# Patient Record
Sex: MALE | Race: WHITE | Employment: OTHER | URBAN - METROPOLITAN AREA
[De-identification: names, ages, dates, MRNs, and addresses within clinical notes are randomized per-mention and may not be internally consistent; named-entity substitution may affect disease eponyms.]

---

## 2018-07-08 ENCOUNTER — APPOINTMENT (OUTPATIENT)
Dept: GENERAL RADIOLOGY | Age: 63
End: 2018-07-08
Attending: EMERGENCY MEDICINE
Payer: COMMERCIAL

## 2018-07-08 ENCOUNTER — HOSPITAL ENCOUNTER (OUTPATIENT)
Age: 63
Discharge: HOME | End: 2018-07-08
Attending: EMERGENCY MEDICINE
Payer: COMMERCIAL

## 2018-07-08 VITALS
HEIGHT: 66 IN | OXYGEN SATURATION: 99 % | HEART RATE: 69 BPM | BODY MASS INDEX: 20.89 KG/M2 | WEIGHT: 130 LBS | SYSTOLIC BLOOD PRESSURE: 156 MMHG | TEMPERATURE: 98 F | DIASTOLIC BLOOD PRESSURE: 79 MMHG | RESPIRATION RATE: 16 BRPM

## 2018-07-08 DIAGNOSIS — S22.41XA CLOSED FRACTURE OF MULTIPLE RIBS OF RIGHT SIDE, INITIAL ENCOUNTER: Primary | ICD-10-CM

## 2018-07-08 PROCEDURE — 99203 OFFICE O/P NEW LOW 30 MIN: CPT

## 2018-07-08 PROCEDURE — 71101 X-RAY EXAM UNILAT RIBS/CHEST: CPT | Performed by: EMERGENCY MEDICINE

## 2018-07-08 NOTE — ED PROVIDER NOTES
Patient Seen in: [facility] In [address] [place] [address]    History   Patient presents with:  Fall (musculoskeletal, neurologic)    Stated Complaint: rib pain    HPI    The patient is a 63-year-old active male who fell onto appear while getting off sounds, nontender nondistended    ED Course   Labs Reviewed - No data to display    ED Course as of Jul 09 1703  ------------------------------------------------------------      MDM   Patient has fractures of R  ribs 7,8,9, and 10 with one fracture in eac

## 2023-09-08 ENCOUNTER — ORDER TRANSCRIPTION (OUTPATIENT)
Dept: ADMINISTRATIVE | Facility: HOSPITAL | Age: 68
End: 2023-09-08

## 2023-09-08 DIAGNOSIS — Z13.9 SCREENING FOR CONDITION: Primary | ICD-10-CM

## 2023-09-11 ENCOUNTER — HOSPITAL ENCOUNTER (OUTPATIENT)
Dept: ULTRASOUND IMAGING | Facility: HOSPITAL | Age: 68
Discharge: HOME | End: 2023-09-11
Attending: PHYSICIAN ASSISTANT

## 2023-09-11 DIAGNOSIS — Z13.9 SCREENING FOR CONDITION: ICD-10-CM

## 2023-10-02 ENCOUNTER — OFFICE VISIT (OUTPATIENT)
Dept: DERMATOLOGY CLINIC | Facility: CLINIC | Age: 68
End: 2023-10-02

## 2023-10-02 DIAGNOSIS — C44.612 BASAL CELL CARCINOMA (BCC) OF RIGHT UPPER ARM: ICD-10-CM

## 2023-10-02 DIAGNOSIS — D23.9 BENIGN NEOPLASM OF SKIN, UNSPECIFIED LOCATION: ICD-10-CM

## 2023-10-02 DIAGNOSIS — D48.5 NEOPLASM OF UNCERTAIN BEHAVIOR OF SKIN: Primary | ICD-10-CM

## 2023-10-02 DIAGNOSIS — D22.9 MULTIPLE NEVI: ICD-10-CM

## 2023-10-02 DIAGNOSIS — C44.612 BASAL CELL CARCINOMA (BCC) OF SKIN OF RIGHT WRIST: ICD-10-CM

## 2023-10-02 DIAGNOSIS — L82.1 SK (SEBORRHEIC KERATOSIS): ICD-10-CM

## 2023-10-02 DIAGNOSIS — L81.4 LENTIGO: ICD-10-CM

## 2023-10-02 PROCEDURE — 99203 OFFICE O/P NEW LOW 30 MIN: CPT | Performed by: DERMATOLOGY

## 2023-10-02 PROCEDURE — 1160F RVW MEDS BY RX/DR IN RCRD: CPT | Performed by: DERMATOLOGY

## 2023-10-02 PROCEDURE — 17262 DSTRJ MAL LES T/A/L 1.1-2.0: CPT | Performed by: DERMATOLOGY

## 2023-10-02 PROCEDURE — 88305 TISSUE EXAM BY PATHOLOGIST: CPT | Performed by: DERMATOLOGY

## 2023-10-02 PROCEDURE — 1159F MED LIST DOCD IN RCRD: CPT | Performed by: DERMATOLOGY

## 2023-10-05 NOTE — PROGRESS NOTES
The pathology report from last visit showed   A. Skin, right antecubital, shave biopsy:  -Nodular basal cell carcinoma, extends to deep tissue edge. B.  Skin, right dorsal wrist, shave biopsy:  -Nodular basal cell carcinoma with extensive squamatization, extends to peripheral and deep tissue edges. Both e&c'ed at [facility] . Re-check in spring when he is back from [place]  . Please log in test results. Kg lopez call patient and inform of results and recommendations.  (please add to history). Pt to  rtc   - or prn.

## 2023-10-15 NOTE — PROGRESS NOTES
Maxim Cuadra is a 79year old male. HPI:     CC:  Patient presents with:  Lesion: \"New Patient\" present for 2 spots of concern on his R forearm. The lesions are scabby and they won't heal. Notes, there is some bleeding at times. Denies personal Hx of skin cancer. Has family Hx of melanoma(Mother). Allergies:  Patient has no known allergies. HISTORY:    Past Medical History:   Diagnosis Date    Nodular basal cell carcinoma (BCC) 10/2023    Right antecubital    Nodular basal cell carcinoma (BCC) 10/2023    Right dorsal wrist      History reviewed. No pertinent surgical history. History reviewed. No pertinent family history. Social History     Socioeconomic History    Marital status:    Tobacco Use    Smoking status: Former    Smokeless tobacco: Never   Other Topics Concern    History of tanning Yes    Reaction to local anesthetic No    Pt has a pacemaker No    Pt has a defibrillator No        No current outpatient medications on file. Allergies:   No Known Allergies    Past Medical History:   Diagnosis Date    Nodular basal cell carcinoma (BCC) 10/2023    Right antecubital    Nodular basal cell carcinoma (BCC) 10/2023    Right dorsal wrist     History reviewed. No pertinent surgical history.   Social History    Socioeconomic History      Marital status:       Spouse name: Not on file      Number of children: Not on file      Years of education: Not on file      Highest education level: Not on file    Occupational History      Not on file    Tobacco Use      Smoking status: Former      Smokeless tobacco: Never    Substance and Sexual Activity      Alcohol use: Not on file      Drug use: Not on file      Sexual activity: Not on file    Other Topics      Concerns:        Grew up on a farm: Not Asked        History of tanning: Yes        Outdoor occupation: Not Asked        Reaction to local anesthetic: No        Pt has a pacemaker: No        Pt has a defibrillator: No    Social History Narrative      Not on file    Social Determinants of Health  Financial Resource Strain: Not on file  Food Insecurity: Not on file  Transportation Needs: Not on file  Physical Activity: Not on file  Stress: Not on file  Social Connections: Not on file  Housing Stability: Not on file  History reviewed. No pertinent family history. There were no vitals filed for this visit. HPI:  Patient presents with:  Lesion: \"New Patient\" present for 2 spots of concern on his R forearm. The lesions are scabby and they won't heal. Notes, there is some bleeding at times. Denies personal Hx of skin cancer. Has family Hx of melanoma(Mother). Patient presents with concerns above. Patient has been in their usual state of health. Past notes/ records and appropriate/relevant lab results including pathology and past body maps reviewed. Including outside notes/ PCP notes as appropriate. Updated and new information noted in current visit. ROS:  Denies other relevant systemic complaints. History, medications, allergies reviewed as noted. Physical Examination:     Well-developed well-nourished patient alert oriented in no acute distress. Exam performed, including scalp, head, neck, face,nails, hair, external eyes, including conjunctival mucosa, eyelids, lips external ears , arms, digits,palms. Multiple light to medium brown, well marginated, uniformly pigmented, macules and papules 6 mm and less scattered on exam. pigmented lesions examined with dermoscopy benign-appearing patterns. Waxy tannish keratotic papules scattered, cherry-red vascular papules scattered. See map today's date for lesions noted . See assessment and plan below for specific lesions. Otherwise remarkable for lesions as noted on map.     See A/P  below for additional information:    Assessment / plan:    Orders Placed This Encounter      Pathology Tissue P      Meds & Refills for this Visit:  Requested Prescriptions No prescriptions requested or ordered in this encounter         Neoplasm of uncertain behavior of skin  (primary encounter diagnosis)  Sk (seborrheic keratosis)  Lentigo  Multiple nevi  Benign neoplasm of skin, unspecified location  Basal cell carcinoma (bcc) of right upper arm  Basal cell carcinoma (bcc) of skin of right wrist    pt with crusted non healing lesions, histoyr of excessive sun Spec 1 Description >>>>>: right antecubital Spec 1 Comment: r/o BCC pearly plaque 1.6cm -base e&c'ed Spec 2 Description >>>>>: right dorsal wrist Spec 2 Comment: 1.8cm erthematous keratotic crusted plaque r/o SCC base e&c'ed Primary Care Physician: PCP, NONE New collection   Basal cell carcinoma. Electrodesiccation and curettage performed. See operative report, consent. Cancers nature discussed. Possibility of recurrence reviewed. Possibility of scarring reviewed. Any postoperative problems including bleeding, signs symptoms of infection reviewed. Postoperative information sheet given. x2 lesions    Extensive lentigines, diffuse actinic damage. Red scaly crusted lesion at  left scapula  Observe-trial of cryo, biopsy if not resolved at next office visit. 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    Extensive lentigines,  No other suspicious lesions presently  Benign keratoses on the upper back reassurance  Waxy tan keratotic papules lesions in areas of concern as noted reassurance given. Benign nature discussed. Possibility of cryo, alphahydroxy acids over-the-counter retinol's discussed. Please refer to map for specific lesions. See additional diagnoses. Pros cons of various therapies, risks benefits discussed. Pathophysiology discussed with patient. Therapeutic options reviewed. See  Medications in grid. Instructions reviewed at length. Benign nevi, seborrheic  keratoses, cherry angiomas:  Reassurance regarding other benign skin lesions. Signs and symptoms of skin cancer, ABCDE's of melanoma discussed with patient. Sunscreen use, sun protection, self exams reviewed. Followup as noted RTC ---routine checkup    6 mos -one year or p.r.n. Encounter Times   Including precharting, reviewing chart, prior notes obtaining history: 10 minutes, medical exam :10 minutes, notes on body map, plan, counseling 10minutes My total time spent caring for the patient on the day of the encounter: 30 minutes     The patient indicates understanding of these issues and agrees to the plan. The patient is asked to return as noted in follow-up/ above. This note was generated using Dragon voice recognition software. Please contact me regarding any confusion resulting from errors in recognition. .  Note to patient and family: The [facility] makes medical notes like these available to patients. However, be advised this is a medical document. It is intended as [id number] communication and monitoring of a patient's care needs. It is written in medical language and may contain abbreviations or verbiage that are unfamiliar. It may appear blunt or direct. Medical documents are intended to carry relevant information, facts as evident and the clinical opinion of the practitioner.

## 2023-10-15 NOTE — PROGRESS NOTES
Operative Report         Electrodesiccation and Curettage  Clinical diagnosis:  Size of lesion:  Location:pt with crusted non healing lesions, histoyr of excessive sun Spec 1 Description >>>>>: right antecubital Spec 1 Comment: r/o BCC pearly plaque 1.6cm -base e&c'ed Spec 2 Description >>>>>: right dorsal wrist Spec 2 Comment: 1.8cm erthematous keratotic crusted plaque r/o SCC base e&c'ed Primary Care Physician: PCP, NONE New collection     Procedure: With patient in appropriate position the skin of the above was scrubbed with alcohol. Anesthesia was obtained by injecting 1% Xylocaine with epinephrine 3.0 cc. Sharp dissection of mass initially achieved with curette. Hemostasis and further destruction of marginal lesion provided by electrodesiccation. This process was repeated 3 times. The biopsy site was dressed with Polysporin and Band-Aid. Estimated blood loss less than 2 cc. The patient tolerated procedure well. Complications: None    Written instructions given and reviewed by nursing staff. Patient indicates understanding.     See pathology report if appropriate

## 2024-08-19 ENCOUNTER — OFFICE VISIT (OUTPATIENT)
Dept: DERMATOLOGY CLINIC | Facility: CLINIC | Age: 69
End: 2024-08-19
Payer: COMMERCIAL

## 2024-08-19 DIAGNOSIS — Z08 ENCOUNTER FOR FOLLOW-UP SURVEILLANCE OF SKIN CANCER: ICD-10-CM

## 2024-08-19 DIAGNOSIS — D23.9 BENIGN NEOPLASM OF SKIN, UNSPECIFIED LOCATION: ICD-10-CM

## 2024-08-19 DIAGNOSIS — D48.5 NEOPLASM OF UNCERTAIN BEHAVIOR OF SKIN: Primary | ICD-10-CM

## 2024-08-19 DIAGNOSIS — L81.4 LENTIGO: ICD-10-CM

## 2024-08-19 DIAGNOSIS — Z85.828 ENCOUNTER FOR FOLLOW-UP SURVEILLANCE OF SKIN CANCER: ICD-10-CM

## 2024-08-19 DIAGNOSIS — C44.519 BCC (BASAL CELL CARCINOMA), BACK: ICD-10-CM

## 2024-08-19 DIAGNOSIS — D22.9 MULTIPLE NEVI: ICD-10-CM

## 2024-08-19 DIAGNOSIS — L82.1 SK (SEBORRHEIC KERATOSIS): ICD-10-CM

## 2024-08-19 PROCEDURE — 17263 DSTRJ MAL LES T/A/L 2.1-3.0: CPT | Performed by: DERMATOLOGY

## 2024-08-19 PROCEDURE — 1160F RVW MEDS BY RX/DR IN RCRD: CPT | Performed by: DERMATOLOGY

## 2024-08-19 PROCEDURE — 1126F AMNT PAIN NOTED NONE PRSNT: CPT | Performed by: DERMATOLOGY

## 2024-08-19 PROCEDURE — 1159F MED LIST DOCD IN RCRD: CPT | Performed by: DERMATOLOGY

## 2024-08-19 PROCEDURE — 99213 OFFICE O/P EST LOW 20 MIN: CPT | Performed by: DERMATOLOGY

## 2024-08-19 PROCEDURE — 88305 TISSUE EXAM BY PATHOLOGIST: CPT | Performed by: DERMATOLOGY

## 2024-08-23 NOTE — PROGRESS NOTES
Logged in path book and pmh.  Pt informed of pathology results and follow up recommendations and verbalized understanding.

## 2024-08-23 NOTE — PROGRESS NOTES
The pathology report from last visit showed   left mid back, shave biopsy:  -Nodular basal cell carcinoma  Lesion treated with ENC at last office visit no further surgery needed  Recommend recheck in 3 to 4 months  Please log in test results.  Please call patient and inform of results and recommendations.  (please add to history).  Pt to  rtc 3 to 4 months or prn.

## 2024-08-28 ENCOUNTER — NURSE ONLY (OUTPATIENT)
Facility: CLINIC | Age: 69
End: 2024-08-28

## 2024-09-01 NOTE — PROGRESS NOTES
Operative Report         Electrodesiccation and Curettage  Clinical diagnosis:  Size of lesion:  Location: Patient with bleeding crusted plaque 2.3cm left mid back r/o BCC -base treated with E&C     Procedure:    With patient in appropriate position the skin of the above was scrubbed with alcohol.  Anesthesia was obtained by injecting 1% Xylocaine with epinephrine 3.0 cc.  Sharp dissection of mass initially achieved with curette.  Hemostasis and further destruction of marginal lesion provided by electrodesiccation.  This process was repeated 3 times.  The biopsy site was dressed with Polysporin and Band-Aid.  Estimated blood loss less than 2 cc.    The patient tolerated procedure well.    Complications: None    Written instructions given and reviewed by nursing staff.  Patient indicates understanding.    See pathology report if appropriate

## 2024-09-01 NOTE — PROGRESS NOTES
Chet Ryan is a 68 year old male.  HPI:     CC:    Chief Complaint   Patient presents with    Full Skin Exam     LOV 10/23. Pt present for full body exam. Pt denies any personal hx of skin CA, Mother has hx of Melanoma. Pt present with a spot of concern on his back, spot was noticed by daughter and was told to get it looked at x 5 months.           Allergies:  Patient has no known allergies.    HISTORY:    Past Medical History:    BCC (basal cell carcinoma)    Left mid back    Nodular basal cell carcinoma (BCC)    Right antecubital    Nodular basal cell carcinoma (BCC)    Right dorsal wrist      History reviewed. No pertinent surgical history.   History reviewed. No pertinent family history.   Social History     Socioeconomic History    Marital status:    Tobacco Use    Smoking status: Former    Smokeless tobacco: Never   Other Topics Concern    History of tanning Yes    Reaction to local anesthetic No    Pt has a pacemaker No    Pt has a defibrillator No        No current outpatient medications on file.     Allergies:   No Known Allergies    Past Medical History:    BCC (basal cell carcinoma)    Left mid back    Nodular basal cell carcinoma (BCC)    Right antecubital    Nodular basal cell carcinoma (BCC)    Right dorsal wrist     History reviewed. No pertinent surgical history.  Social History     Socioeconomic History    Marital status:      Spouse name: Not on file    Number of children: Not on file    Years of education: Not on file    Highest education level: Not on file   Occupational History    Not on file   Tobacco Use    Smoking status: Former    Smokeless tobacco: Never   Substance and Sexual Activity    Alcohol use: Not on file    Drug use: Not on file    Sexual activity: Not on file   Other Topics Concern    Grew up on a farm Not Asked    History of tanning Yes    Outdoor occupation Not Asked    Reaction to local anesthetic No    Pt has a pacemaker No    Pt has a defibrillator No    Social History Narrative    Not on file     Social Determinants of Health     Financial Resource Strain: Not on file   Food Insecurity: Not on file   Transportation Needs: Not on file   Physical Activity: Not on file   Stress: Not on file   Social Connections: Not on file   Housing Stability: Not on file     History reviewed. No pertinent family history.    There were no vitals filed for this visit.    HPI:  Chief Complaint   Patient presents with    Full Skin Exam     LOV 10/23. Pt present for full body exam. Pt denies any personal hx of skin CA, Mother has hx of Melanoma. Pt present with a spot of concern on his back, spot was noticed by daughter and was told to get it looked at x 5 months.             Patient presents with concerns above.    Patient has been in their usual state of health.     Past notes/ records and appropriate/relevant lab results including pathology and past body maps reviewed. Including outside notes/ PCP notes as appropriate. Updated and new information noted in current visit.     ROS:  Denies other relevant systemic complaints.      History, medications, allergies reviewed as noted.       Physical Examination:     Well-developed well-nourished patient alert oriented in no acute distress.  Exam performed, including scalp, head, neck, face,nails, hair, external eyes, including conjunctival mucosa, eyelids, lips external ears , arms, digits,palms.     Multiple light to medium brown, well marginated, uniformly pigmented, macules and papules 6 mm and less scattered on exam. pigmented lesions examined with dermoscopy benign-appearing patterns.     Waxy tannish keratotic papules scattered, cherry-red vascular papules scattered.    See map today's date for lesions noted .  See assessment and plan below for specific lesions.    Otherwise remarkable for lesions as noted on map.    See A/P  below for additional information:    Assessment / plan:    Orders Placed This Encounter   Procedures     Specimen to Pathology, Tissue [[facility] Pt to [facility] lab]       Meds & Refills for this Visit:  Requested Prescriptions      No prescriptions requested or ordered in this encounter         Encounter Diagnoses   Name Primary?    Neoplasm of uncertain behavior of skin Yes    Multiple nevi     Lentigo     SK (seborrheic keratosis)     Benign neoplasm of skin, unspecified location     Encounter for follow-up surveillance of skin cancer     BCC (basal cell carcinoma), back [C44.519]           Patient with bleeding crusted plaque 2.3cm left mid back r/o BCC -base treated with E&C new primary lesion sent for path  Basal   cell carcinoma.  Electrodesiccation and curettage performed.  See operative report, consent.  Cancers nature discussed.  Possibility of recurrence reviewed.  Possibility of scarring reviewed.  Any postoperative problems including bleeding, signs symptoms of infection reviewed.  Postoperative information sheet given.      Right antecubital fossa, BCC right dorsal wrist post ENC 10/23    Extensive lentigines, diffuse actinic damage multiple SKs    Lots of sun outdoors in [place].  Outdoors retail rides motorcycle.  Lots of sun in the past.    Extensive lentigines,  No other suspicious lesions presently  Benign keratoses on the upper back reassurance  Waxy tan keratotic papules lesions in areas of concern as noted reassurance given.  Benign nature discussed.  Possibility of cryo, alphahydroxy acids over-the-counter retinol's discussed.    Please refer to map for specific lesions.  See additional diagnoses.  Pros cons of various therapies, risks benefits discussed.Pathophysiology discussed with patient.  Therapeutic options reviewed.  See  Medications in grid.  Instructions reviewed at length.    Benign nevi, seborrheic  keratoses, cherry angiomas:  Reassurance regarding other benign skin lesions.Signs and symptoms of skin cancer, ABCDE's of melanoma discussed with patient. Sunscreen use, sun protection, self  exams reviewed.  Followup as noted RTC ---routine checkup    6 mos -one year or p.r.n.    Encounter Times   Including precharting, reviewing chart, prior notes obtaining history: 10 minutes, medical exam :10 minutes, notes on body map, plan, counseling 10minutes My total time spent caring for the patient on the day of the encounter: 30 minutes     The patient indicates understanding of these issues and agrees to the plan.  The patient is asked to return as noted in follow-up/ above.    This note was generated using Dragon voice recognition software.  Please contact me regarding any confusion resulting from errors in recognition..  Note to patient and family: The 21[employer] Century [employer] Act makes medical notes like these available to patients. However, be advised this is a medical document. It is intended as rlno-or-swvl communication and monitoring of a patient's care needs. It is written in medical language and may contain abbreviations or verbiage that are unfamiliar. It may appear blunt or direct. Medical documents are intended to carry relevant information, facts as evident and the clinical opinion of the practitioner.

## 2024-09-04 ENCOUNTER — LAB ENCOUNTER (OUTPATIENT)
Dept: LAB | Facility: HOSPITAL | Age: 69
End: 2024-09-04
Payer: MEDICARE

## 2024-09-04 ENCOUNTER — OFFICE VISIT (OUTPATIENT)
Facility: CLINIC | Age: 69
End: 2024-09-04

## 2024-09-04 VITALS
HEIGHT: 65 IN | DIASTOLIC BLOOD PRESSURE: 76 MMHG | SYSTOLIC BLOOD PRESSURE: 160 MMHG | BODY MASS INDEX: 21.96 KG/M2 | WEIGHT: 131.81 LBS | HEART RATE: 79 BPM

## 2024-09-04 DIAGNOSIS — F10.10 ALCOHOL ABUSE, DAILY USE: ICD-10-CM

## 2024-09-04 DIAGNOSIS — Z12.11 COLON CANCER SCREENING: Primary | ICD-10-CM

## 2024-09-04 LAB
ALBUMIN SERPL-MCNC: 4.7 G/DL (ref 3.2–4.8)
ALP LIVER SERPL-CCNC: 57 U/L
ALT SERPL-CCNC: 18 U/L
AST SERPL-CCNC: 32 U/L (ref ?–34)
BILIRUB DIRECT SERPL-MCNC: 0.3 MG/DL (ref ?–0.3)
BILIRUB SERPL-MCNC: 0.7 MG/DL (ref 0.2–1.1)
PROT SERPL-MCNC: 7.9 G/DL (ref 5.7–8.2)

## 2024-09-04 PROCEDURE — 3077F SYST BP >= 140 MM HG: CPT

## 2024-09-04 PROCEDURE — 3008F BODY MASS INDEX DOCD: CPT

## 2024-09-04 PROCEDURE — 99204 OFFICE O/P NEW MOD 45 MIN: CPT

## 2024-09-04 PROCEDURE — 36415 COLL VENOUS BLD VENIPUNCTURE: CPT

## 2024-09-04 PROCEDURE — 3078F DIAST BP <80 MM HG: CPT

## 2024-09-04 PROCEDURE — 1160F RVW MEDS BY RX/DR IN RCRD: CPT

## 2024-09-04 PROCEDURE — 1126F AMNT PAIN NOTED NONE PRSNT: CPT

## 2024-09-04 PROCEDURE — 80076 HEPATIC FUNCTION PANEL: CPT

## 2024-09-04 PROCEDURE — 1159F MED LIST DOCD IN RCRD: CPT

## 2024-09-04 NOTE — H&P
[facility] - Gastroenterology                                                                                                  Clinic History and Physical     Chief Complaint   Patient presents with    Colonoscopy Screening       Requesting physician or provider: None Pcp    HPI:   Chet Ryan is a 68 year old year-old male with active diagnoses including none. Prior medical/surgical hx in note table. The patient presents for colon cancer screening evaluation.    #daily alcohol use  -daily alcohol use for 15 years, about 6-7 beers daily, reports he drinks for social/recreational reasons. Denies prior self or family liver issues. Unsure if PCP at [facility] checked liver function labs.     Patient is here today as a referral from his PCP for evaluation prior to undergoing colonoscopy for CRC screening. Patient denies any GI symptoms of nausea, vomiting, heartburn, dyspepsia, dysphagia, hematemesis, abdominal pain, change in bowel habits, constipation, diarrhea, hematochezia, or melena.  Additionally there is no unintentional weight loss.    Pt is due for CRC screening. We discussed the available screening options for CRC such as FIT and cologuard. They are electing to pursue colonoscopy at this time. Has done FIT testing in the past, including earlier this yeat and always had negative results.    Last colonoscopy: none   Last EGD:  none     NSAIDS:  none   Tobacco: none   Alcohol: 6-7 beers daily   Marijuana: none   Illicit drugs: none     FH GI malignancy: none     No history of adverse reaction to sedation  No RAYNE  No anticoagulants/antiplatelet  No pacemaker/defibrillator  No pain medications and/or sleep aides    Wt Readings from Last 6 Encounters:   09/04/24 131 lb 12.8 oz (59.8 kg)   07/08/18 130 lb (59 kg)          History, Medications, Allergies, ROS:      Past Medical History:    BCC (basal cell carcinoma)    Left mid back    Nodular basal cell carcinoma (BCC)    Right antecubital    Nodular basal  cell carcinoma (BCC)    Right dorsal wrist      Past Surgical History:   Procedure Laterality Date    Appendectomy      age 7      Family Hx: History reviewed. No pertinent family history.   Social History:   Social History     Socioeconomic History    Marital status:    Tobacco Use    Smoking status: Former     Current packs/day: 0.00     Types: Cigarettes     Quit date: 1980     Years since quittin.9    Smokeless tobacco: Never   Substance and Sexual Activity    Alcohol use: Yes     Alcohol/week: 60.0 standard drinks of alcohol     Types: 60 Cans of beer per week     Comment: DAILY 6-7    Drug use: Not Currently   Other Topics Concern    History of tanning Yes    Reaction to local anesthetic No    Pt has a pacemaker No    Pt has a defibrillator No        Medications (Active prior to today's visit):  No current outpatient medications on file.       Allergies:  No Known Allergies    ROS:   CONSTITUTIONAL: negative for fevers, chills, sweats  EYES Negative for scleral icterus or redness, and diplopia  HEENT: Negative for hoarseness  RESPIRATORY: Negative for cough and severe shortness of breath  CARDIOVASCULAR: Negative for crushing sub-sternal chest pain  GASTROINTESTINAL: See HPI  GENITOURINARY: Negative for dysuria  MUSCULOSKELETAL: Negative for arthralgias and myalgias  SKIN: Negative for jaundice, rash or pruritus  NEUROLOGICAL: Negative for dizziness and headaches  BEHAVIOR/PSYCH: Negative for psychotic behavior      PHYSICAL EXAM:   Blood pressure 160/76, pulse 79, height 5' 5\" (1.651 m), weight 131 lb 12.8 oz (59.8 kg).    GEN: Alert, no acute distress, well-nourished   HEENT: anicteric sclera, neck supple, trachea midline, MMM, no palpable or tender neck or supraclavicular lymph nodes  CV: RRR, the extremities are warm and well perfused   LUNGS: No increased work of breathing, CTAB  ABDOMEN: Soft, symmetrical, non-tender without distention or guarding. No scars or lesions. Aorta is without  bruit or visible pulsation. Umbilicus is midline without herniation. Normoactive bowel sounds are present, No masses, hepatomegaly or splenomegaly noted.  MSK: No erythema, no warmth, no swelling of joints  SKIN: No jaundice, no erythema, no rashes, no lesions  HEMATOLOGIC: No bleeding, no bruising  NEURO: Alert and interactive, SAMANIEGO  PSYCH: appropriate mood & affect    Labs/Imaging:     Patient's labs and imaging were reviewed and discussed with patient today.    .  ASSESSMENT/PLAN:   Chet Ryan is a 68 year old year-old male with active diagnoses including none. Prior medical/surgical hx in note table. The patient presents for colon cancer screening evaluation.    #daily alcohol use  -daily alcohol use for 15 years, about 6-7 beers daily, reports he drinks for social/recreational reasons. Denies prior self or family liver issues. Unsure if PCP at [facility] checked liver function labs.   -LFTs & US elastography ordered    #colorectal cancer screening: patient is considered average risk for colon cancer (No immediate family hx of colon cancer) and it is appropriate to proceed with screening colonoscopy. Patient is currently asymptomatic and denies diarrhea, hematochezia, thin-stools or weight loss. We discussed risks/benefits/alternatives to procedure, including stool testing, they want to proceed with colonoscopy.  -no prior colonoscopy but does report regular FIT tests through PCP and/or insurance and always had negative test, including one earlier this year  -pt lives in [place] most of the year. He leaves again end of this month and will not return until June 2025. I told him to call clinic in January to have the colonoscopy booked for summer 2025. Return precautions given for any new GI symptoms.     Recommend:  -decrease alcohol use     -call US liver elastography #[phone]    -go to lab for liver function test    -call in January 2025 for us to book the colonoscopy for June 2025    -Schedule  colonoscopy with General Pool Endoscopist  Diagnosis: CRC screen  Sedation: MAC or IV  Prep: split dose golytely    -Anti-platelets and anti-coagulants: N/A    -Diabetes meds: N/A     Endoscopy risk/benefit discussion: I have thoroughly discussed the risks, benefits, and alternatives of endoscopic evaluation with the patient, who demonstrated understanding. This includes the potential risks of bleeding, infection, pain, anesthesia complications, and perforation, which may result in prolonged hospitalization or surgical intervention. All of the patient’s questions were addressed to their satisfaction. The patient has chosen to proceed with the endoscopic procedure, including any necessary interventions such as polypectomy, biopsy, control of bleeding.      Orders This Visit:  Orders Placed This Encounter   Procedures    Hepatic Function Panel (7)       Meds This Visit:  Requested Prescriptions      No prescriptions requested or ordered in this encounter       Imaging & Referrals:  US LIVER WITH ELASTOGRAPHY(CPT=76705,57726)       LAUREN Nunez    [facility]  9/4/2024      The dictation was partially prepared using Dragon Medical voice recognition software. As a result, errors may occur. When identified, these errors have been corrected. While every attempt is made to correct errors during dictation, discrepancies may still exist.

## 2024-09-04 NOTE — PATIENT INSTRUCTIONS
-decrease alcohol use     -call US liver elastography #[phone]    -go to lab for liver function test    -call in January 2025 for us to book the colonoscopy for June 2025    1. Schedule colonoscopy with General Pool Endoscopist  Diagnosis: CRC screen  Sedation: MAC or IV  Prep: split dose golytely    2.  bowel prep from pharmacy   You can pick the bowel prep up now and store in a cool, dry place in your home until your scheduled bowel prep start date.    3. Continue all medications as normal for your procedure. DO NOT TAKE: Any form of alcohol, recreational drugs and any forms of erectile dysfunction medications 24 hours prior to procedure.    4. Read all bowel prep instructions carefully. Bowel prep instructions can also be found online at:  [email or web address].org/giprep     5. AVOID seeds, nuts, popcorn, raw fruits and vegetables for 5 days before procedure    6. If you start any NEW medication after your visit today, please notify us. Certain medications (like iron or weight loss medications) will need to be held before the procedure, or the procedure cannot be performed safely.

## 2024-09-15 ENCOUNTER — HOSPITAL ENCOUNTER (OUTPATIENT)
Age: 69
Discharge: HOME | End: 2024-09-15
Payer: MEDICARE

## 2024-09-15 VITALS
HEART RATE: 100 BPM | TEMPERATURE: 98 F | DIASTOLIC BLOOD PRESSURE: 78 MMHG | SYSTOLIC BLOOD PRESSURE: 158 MMHG | RESPIRATION RATE: 16 BRPM | OXYGEN SATURATION: 100 %
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
## 2024-09-15 DIAGNOSIS — L08.9 INFECTED DOG BITE: Primary | ICD-10-CM

## 2024-09-15 DIAGNOSIS — I89.1 LYMPHANGITIS: ICD-10-CM

## 2024-09-15 NOTE — ED PROVIDER NOTES
Patient Seen in: [facility]      History     Chief Complaint   Patient presents with    Bite     Stated Complaint: Dog Bite  Subjective:   HPI    This is a 68-year-old with a dog bite to the webbing in between the second and third digit of the left hand.  Night by his daughters dog.  Daughter's dog is up-to-date with immunizations.  Patient denies any numbness or tingling.  Woke up today and it was erythematous which prompted the visit.  No fever or chills.  No nausea.  States he otherwise feels well.  Normal flexion extension of all digits not up-to-date with tetanus    Objective:   Past Medical History:    BCC (basal cell carcinoma)    Left mid back    Nodular basal cell carcinoma (BCC)    Right antecubital    Nodular basal cell carcinoma (BCC)    Right dorsal wrist            Past Surgical History:   Procedure Laterality Date    Appendectomy      age 7              Social History     Socioeconomic History    Marital status:    Tobacco Use    Smoking status: Former     Current packs/day: 0.00     Types: Cigarettes     Quit date: 1980     Years since quittin.0    Smokeless tobacco: Never   Substance and Sexual Activity    Alcohol use: Yes     Alcohol/week: 60.0 standard drinks of alcohol     Types: 60 Cans of beer per week     Comment: DAILY 6-7    Drug use: Not Currently   Other Topics Concern    History of tanning Yes    Reaction to local anesthetic No    Pt has a pacemaker No    Pt has a defibrillator No            Review of Systems   All other systems reviewed and are negative.      Positive for stated complaint: Bite    Other systems are as noted in HPI.  Constitutional and vital signs reviewed.      All other systems reviewed and negative except as noted above.    Physical Exam     ED Triage Vitals [09/15/24 1348]   /78   Pulse 111   Resp 16   Temp 98.1 °F (36.7 °C)   Temp src Temporal   SpO2 100 %   O2 Device None (Room air)     Current:/78   Pulse 100   Temp 98.1  °F (36.7 °C) (Temporal)   Resp 16   SpO2 100%     Physical Exam  Vitals and nursing note reviewed.   Constitutional:       General: He is awake. He is not in acute distress.     Appearance: Normal appearance. He is not ill-appearing, toxic-appearing or diaphoretic.   HENT:      Head: Normocephalic and atraumatic.      Right Ear: Tympanic membrane, ear canal and external ear normal.      Left Ear: Tympanic membrane, ear canal and external ear normal.      Nose: Nose normal.      Mouth/Throat:      Mouth: Mucous membranes are moist.      Pharynx: Oropharynx is clear. Uvula midline.   Eyes:      General: Lids are normal.      Extraocular Movements: Extraocular movements intact.      Conjunctiva/sclera: Conjunctivae normal.      Pupils: Pupils are equal, round, and reactive to light.   Cardiovascular:      Rate and Rhythm: Normal rate and regular rhythm.      Pulses: Normal pulses.      Heart sounds: Normal heart sounds.   Pulmonary:      Effort: Pulmonary effort is normal.      Breath sounds: Normal breath sounds and air entry. No stridor, decreased air movement or transmitted upper airway sounds.   Skin:     General: Skin is warm and dry.      Capillary Refill: Capillary refill takes less than 2 seconds.   Neurological:      General: No focal deficit present.      Mental Status: He is alert and oriented to person, place, and time.   Psychiatric:         Mood and Affect: Mood normal.         Behavior: Behavior normal. Behavior is cooperative.         Thought Content: Thought content normal.         Judgment: Judgment normal.               Normal flexion and extension of the digits.  Cap refill less than 3 seconds.  Neuro vastly intact  ED Course   The wound was irrigated with copious amounts of saline, Neosporin and dressing applied.  No results found.  Labs Reviewed - No data to display    MDM     Medical Decision Making  Differential diagnoses reflecting the complexity of care include but are not limited to  lymphangitis, tenosynovitis, dog bite, infected dog bite.    Comorbidities that add complexity to management include: N/A  History obtained by an independent source was from: N/A  Discussions of management was done with: Dr. Russell  My independent interpretations of studies include: N/A  Shared decision making was done by: Dr. Russell, patient and myself  Patient is well appearing, non-toxic and in no acute distress.  Vital signs are stable.  Patient otherwise feels well, he is afebrile.  I did discuss with him is imperative he starts his antibiotics today.  Any fever, chills, increase in redness or streaking he had immediately needs to be seen in the emergency department.  Tomorrow morning please call hand specialist that is provided you on your discharge paperwork.  Patient and daughter verbalized care and states understanding.  Extremity neurovascular intact at discharge.    Problems Addressed:  Infected dog bite: acute illness or injury  Lymphangitis: acute illness or injury    Amount and/or Complexity of Data Reviewed  Independent Historian: caregiver     Details: Daughter  ECG/medicine tests: ordered and independent interpretation performed. Decision-making details documented in ED Course.    Risk  OTC drugs.  Prescription drug management.        Disposition and Plan     Clinical Impression:  1. Infected dog bite    2. Lymphangitis         Disposition:  Discharge  9/15/2024  2:22 pm    Follow-up:  [facility]  [address] [facility] [address]  [phone]          Beni Chadwick MD  [address] [facility] [address]  [phone]                Medications Prescribed:  Discharge Medication List as of 9/15/2024  2:28 PM        START taking these medications    Details   amoxicillin clavulanate 875-125 MG Oral Tab Take 1 tablet by mouth 2 (two) times daily for 10 days., Normal, Disp-20 tablet, R-0                Note to patient: The 21st Century cares act makes medical notes like these available to  patients in the interest of transparency.  However, be advised this medical document and is intended as peer to peer communication.  It is read the medical language and may contain abbreviations or verbiage that are unfamiliar.  It may appear blunt or direct.  Medical documents are intended to carry relevant information, fax is evident and the clinical opinion of the practitioner.    This note was prepared using Dragon Medical voice recognition dictation software.  As a result, errors may occur.  When identified, these errors have been corrected.  While every attempt is made to correct errors during dictation, discrepancies may still exist.    Maribel Hester, LAUREN  9/15/2024  2:06 PM

## 2024-09-15 NOTE — DISCHARGE INSTRUCTIONS
Please keep the area clean and dry.  Is imperative you start the antibiotics today and complete entire course of treatment.  I have put the hand specialist on your discharge paperwork that I would like you to call tomorrow to schedule a follow-up appointment.  Any increase in redness, streaking, fever, chills or any other concerns please immediately go to the emergency department.

## 2024-09-18 ENCOUNTER — HOSPITAL ENCOUNTER (OUTPATIENT)
Dept: ULTRASOUND IMAGING | Age: 69
Discharge: HOME | End: 2024-09-18
Payer: MEDICARE

## 2024-09-18 DIAGNOSIS — F10.10 ALCOHOL ABUSE, DAILY USE: ICD-10-CM

## 2024-09-18 PROCEDURE — 76981 USE PARENCHYMA: CPT

## 2024-09-18 PROCEDURE — 76705 ECHO EXAM OF ABDOMEN: CPT

## 2024-09-19 ENCOUNTER — TELEPHONE (OUTPATIENT)
Facility: CLINIC | Age: 69
End: 2024-09-19

## 2024-09-19 DIAGNOSIS — K76.9 HEPATIC LESION: ICD-10-CM

## 2024-09-19 DIAGNOSIS — K74.00 LIVER FIBROSIS: Primary | ICD-10-CM

## 2024-09-19 NOTE — TELEPHONE ENCOUNTER
----- Message from Zaida Moore sent at 9/19/2024  3:27 PM CDT -----  F1 metavir score. Multiple liver lesions. Advised patient avoid alcohol  GI RNs - please recall for repeat liver ultrasound in June 2025 (already ordered).

## 2025-06-12 ENCOUNTER — TELEPHONE (OUTPATIENT)
Dept: GASTROENTEROLOGY | Facility: CLINIC | Age: 70
End: 2025-06-12

## 2025-06-12 NOTE — TELEPHONE ENCOUNTER
Spoke to patient and informed he is due to have repeat US. Patient verbalized understanding. Central scheduling phone number provided.

## 2025-07-17 ENCOUNTER — TELEPHONE (OUTPATIENT)
Facility: CLINIC | Age: 70
End: 2025-07-17

## 2025-07-17 NOTE — TELEPHONE ENCOUNTER
1st Reminder letter was sent to patient mychart:   US LIVER WITH ELASTOGRAPHY(CPT=76705,[id number]) (Order #[id number]) on 9/19/24

## (undated) NOTE — LETTER
7/17/2025          Chet Ryan    [address] [facility] [address]         Dear Chet,    Our records indicate that the tests ordered for you by LAURNE Nunez  have not been done.  If you have, in fact, already completed the tests or you do not wish to have the tests done, please contact our office at THE NUMBER LISTED BELOW.  Otherwise, please proceed with the testing.  Enclosed is a duplicate order for your convenience.    US LIVER WITH ELASTOGRAPHY(CPT=76705,[id number]) (Order #[id number]) on 9/19/24   To schedule a test, call Central Scheduling at [phone]    Sincerely,    LAUREN Nunez  [facility]  [address] [address] [phone]